# Patient Record
Sex: MALE | Race: ASIAN | NOT HISPANIC OR LATINO
[De-identification: names, ages, dates, MRNs, and addresses within clinical notes are randomized per-mention and may not be internally consistent; named-entity substitution may affect disease eponyms.]

---

## 2021-01-11 ENCOUNTER — APPOINTMENT (OUTPATIENT)
Dept: COLORECTAL SURGERY | Facility: CLINIC | Age: 51
End: 2021-01-11
Payer: MEDICAID

## 2021-01-11 VITALS
WEIGHT: 110 LBS | BODY MASS INDEX: 18.11 KG/M2 | HEIGHT: 65.35 IN | SYSTOLIC BLOOD PRESSURE: 88 MMHG | DIASTOLIC BLOOD PRESSURE: 60 MMHG | HEART RATE: 80 BPM | TEMPERATURE: 98.4 F

## 2021-01-11 DIAGNOSIS — Z87.891 PERSONAL HISTORY OF NICOTINE DEPENDENCE: ICD-10-CM

## 2021-01-11 DIAGNOSIS — B19.20 UNSPECIFIED VIRAL HEPATITIS C W/OUT HEPATIC COMA: ICD-10-CM

## 2021-01-11 PROBLEM — Z00.00 ENCOUNTER FOR PREVENTIVE HEALTH EXAMINATION: Status: ACTIVE | Noted: 2021-01-11

## 2021-01-11 PROCEDURE — 99202 OFFICE O/P NEW SF 15 MIN: CPT | Mod: 25

## 2021-01-11 PROCEDURE — 99072 ADDL SUPL MATRL&STAF TM PHE: CPT

## 2021-01-11 PROCEDURE — 46221 LIGATION OF HEMORRHOID(S): CPT

## 2021-01-11 RX ORDER — HYDROCORTISONE 25 MG/G
2.5 OINTMENT TOPICAL
Refills: 0 | Status: ACTIVE | COMMUNITY

## 2021-01-11 RX ORDER — ACETAMINOPHEN 500 MG/1
500 CAPSULE ORAL
Refills: 0 | Status: ACTIVE | COMMUNITY

## 2021-01-11 RX ORDER — MINERAL OIL, PETROLATUM, PHENYLEPHRINE HCL 2.5; 140; 749 MG/G; MG/G; MG/G
OINTMENT TOPICAL
Refills: 0 | Status: ACTIVE | COMMUNITY

## 2021-01-11 RX ORDER — LANSOPRAZOLE 30 MG/1
30 CAPSULE, DELAYED RELEASE ORAL
Refills: 0 | Status: ACTIVE | COMMUNITY

## 2021-01-11 RX ORDER — LINACLOTIDE 145 UG/1
145 CAPSULE, GELATIN COATED ORAL
Refills: 0 | Status: ACTIVE | COMMUNITY

## 2021-01-11 RX ORDER — CARBOXYMETHYLCELLULOSE SODIUM 5 MG/ML
0.5 SOLUTION/ DROPS OPHTHALMIC
Refills: 0 | Status: ACTIVE | COMMUNITY

## 2021-01-11 RX ORDER — B-COMPLEX WITH VITAMIN C
TABLET ORAL
Refills: 0 | Status: ACTIVE | COMMUNITY

## 2021-01-11 NOTE — PHYSICAL EXAM
[Excoriation] : no perianal excoriation [Skin Tags] : there were no residual hemorrhoidal skin tags seen [Normal] : was normal [None] : there was no rectal mass  [FreeTextEntry1] : A lighted anoscope was passed into the anal canal and the entire anal mucosal surface was inspected..  The findings revealed moderate internal hemorrhoids. No masses or lesions were identified.\par \par The risks and benefits of rubber band ligation were discussed with the patient including but not limited to bleeding, pain, infection, and the need for future procedures. The anoscope was placed and rubber band ligation was performed of the internal hemorrhoids - RAQ and RPQ with good result. The patient tolerated the procedure well. Appropriate postprocedure instructions were given to the patient.\par

## 2021-01-11 NOTE — HISTORY OF PRESENT ILLNESS
[FreeTextEntry1] : 51 y/o Cantonese speaking M presents for evaluation of hemorrhoids, referred by Dr. Sullivan\par H/o Hepatitis C. Completed treatment with Epclusa from 4/4/20-6/27/20 \par PSH of RBL of RA 10/19/20, LL column 11/6/20, LP column 11/1120, RP column 12/4/20, and RL column 12/18/20\par C/o issue with prolapsing hemorrhoids for the past 2 years that requires manual reduction\par Reports pain and BRBPR on the TP with every BM\par Denies itching\par BH: Daily \par Admits to constipation and straining\par Previously prescribed Linzess 145 mcg. Has been taking daily for 1 month with mild improvement in symptoms \par Admits to limited dietary fiber intake. Drinking 1-2 cups of water daily \par Has tried Prep H ointment and Hydrocortisone 2.5% ointment with little to no improvement in symptoms\par Last colonoscopy completed 10/19/20, two several benign polyps removed\par No ASA/NSAIDs last 7 days \par

## 2021-01-11 NOTE — ASSESSMENT
[FreeTextEntry1] : I had a detailed discussion with the patient regarding optimization of bowel movements with increasing daily fiber and water intake. Both synthetic and dietary fiber recommendations were reviewed. I had strongly counseled the patient regarding the need for increasing water to help improve  bowel function.\par \par I discussed with the patient that improving  bowel function will alleviate  hemorrhoidal symptoms.\par \par Advised return in 4-6 weeks if symptoms are persistent. Likely will require rubber band ligation of left lateral internal hemorrhoids.\par \par

## 2021-02-19 ENCOUNTER — APPOINTMENT (OUTPATIENT)
Dept: COLORECTAL SURGERY | Facility: CLINIC | Age: 51
End: 2021-02-19
Payer: MEDICAID

## 2021-02-19 VITALS
BODY MASS INDEX: 20.2 KG/M2 | HEIGHT: 63 IN | SYSTOLIC BLOOD PRESSURE: 104 MMHG | DIASTOLIC BLOOD PRESSURE: 72 MMHG | TEMPERATURE: 98 F | WEIGHT: 114 LBS | HEART RATE: 67 BPM

## 2021-02-19 PROCEDURE — 99072 ADDL SUPL MATRL&STAF TM PHE: CPT

## 2021-02-19 PROCEDURE — 46221 LIGATION OF HEMORRHOID(S): CPT

## 2021-02-19 PROCEDURE — 99213 OFFICE O/P EST LOW 20 MIN: CPT | Mod: 25

## 2021-02-19 NOTE — ASSESSMENT
[FreeTextEntry1] : Advise consideration of possible hemorrhoid Dearterialization procedure if prolapse is persistent.\par \par

## 2021-02-19 NOTE — PHYSICAL EXAM
[Excoriation] : no perianal excoriation [Skin Tags] : there were no residual hemorrhoidal skin tags seen [Normal] : was normal [None] : there was no rectal mass  [FreeTextEntry1] : A lighted anoscope was passed into the anal canal and the entire anal mucosal surface was inspected..  The findings revealed moderate internal hemorrhoids. No masses or lesions were identified.\par \par The risks and benefits of rubber band ligation were discussed with the patient including but not limited to bleeding, pain, infection, and the need for future procedures. The anoscope was placed and rubber band ligation was performed of the internal hemorrhoids - Left lateral and RAQ with good result. The patient tolerated the procedure well. Appropriate postprocedure instructions were given to the patient.\par

## 2021-04-09 ENCOUNTER — APPOINTMENT (OUTPATIENT)
Dept: COLORECTAL SURGERY | Facility: CLINIC | Age: 51
End: 2021-04-09
Payer: MEDICAID

## 2021-04-09 VITALS
HEART RATE: 76 BPM | HEIGHT: 63 IN | TEMPERATURE: 98.1 F | SYSTOLIC BLOOD PRESSURE: 97 MMHG | WEIGHT: 116 LBS | BODY MASS INDEX: 20.55 KG/M2 | DIASTOLIC BLOOD PRESSURE: 63 MMHG

## 2021-04-09 PROCEDURE — 99072 ADDL SUPL MATRL&STAF TM PHE: CPT

## 2021-04-09 PROCEDURE — 46221 LIGATION OF HEMORRHOID(S): CPT

## 2021-04-09 PROCEDURE — 99213 OFFICE O/P EST LOW 20 MIN: CPT | Mod: 25

## 2021-04-09 NOTE — HISTORY OF PRESENT ILLNESS
[FreeTextEntry1] : 51 y/o Cantonese speaking M presents for f/u hemorrhoids\par PSH of RBL of RA 10/19/20, LL column 11/6/20, LP column 11/11/20, RP column 12/4/20, and RL column 12/18/20\par \par Last seen 2/19/21 w/ complaint of unchanged symptoms and continued prolapsing tissue, s/p RBL to LL and RAQ.\par Still using hydrocortisone ointment 2-3 times per week when hemorrhoid prolapses. Will manually reduce hemorrhoid \par Denies pain, itching, bleeding\par BH: Daily to \par Admits occasional constipation and straining\par Previously on Linzess but no longer using, favors drinking a cup of prune juice daily for regularity\par Has been working on increasing dietary fiber intake. Currently Drinking 5-6 cups of water daily\par Last colonoscopy completed 10/19/20, two several benign polyps removed\par No ASA/NSAIDs last 7 days

## 2021-04-09 NOTE — ASSESSMENT
[FreeTextEntry1] : I had a detailed discussion with the patient regarding optimization of bowel movements with increasing daily fiber and water intake. Both synthetic and dietary fiber recommendations were reviewed. I had strongly counseled the patient regarding the need for increasing water to help improve  bowel function.\par \par I discussed with the patient that improving  bowel function will alleviate  hemorrhoidal symptoms.\par \par Advised return in 4-6 weeks if symptoms are persistent.I recommended the patient obtained a photographs prior to his next visit to further assess degree of residual protrusion. The potential of residual left-sided protrusion will be addressed if photo documentation confirms its presence.\par

## 2021-04-09 NOTE — PHYSICAL EXAM
[Excoriation] : no perianal excoriation [Skin Tags] : there were no residual hemorrhoidal skin tags seen [Normal] : was normal [None] : there was no rectal mass  [FreeTextEntry1] : A lighted anoscope was passed into the anal canal and the entire anal mucosal surface was inspected..  The findings revealed moderate internal hemorrhoids. No masses or lesions were identified.\par \par The risks and benefits of rubber band ligation were discussed with the patient including but not limited to bleeding, pain, infection, and the need for future procedures. The anoscope was placed and rubber band ligation was performed of the internal hemorrhoids - RPQ with good result. The patient tolerated the procedure well. Appropriate postprocedure instructions were given to the patient.\par

## 2021-05-21 ENCOUNTER — APPOINTMENT (OUTPATIENT)
Dept: COLORECTAL SURGERY | Facility: CLINIC | Age: 51
End: 2021-05-21
Payer: MEDICAID

## 2021-05-21 VITALS
DIASTOLIC BLOOD PRESSURE: 70 MMHG | HEIGHT: 63 IN | TEMPERATURE: 98.1 F | SYSTOLIC BLOOD PRESSURE: 104 MMHG | WEIGHT: 118 LBS | HEART RATE: 75 BPM | BODY MASS INDEX: 20.91 KG/M2

## 2021-05-21 PROCEDURE — 46221 LIGATION OF HEMORRHOID(S): CPT

## 2021-05-21 PROCEDURE — 99213 OFFICE O/P EST LOW 20 MIN: CPT | Mod: 25

## 2021-05-21 NOTE — PHYSICAL EXAM
[Excoriation] : no perianal excoriation [Skin Tags] : there were no residual hemorrhoidal skin tags seen [Normal] : was normal [None] : there was no rectal mass  [FreeTextEntry1] : Medical assistant was present for the entire exam.\par \par Anoscopy was performed for evaluation of the patients rectal bleeding  history .\par The risks, benefits and alternatives were reviewed.\par \par A lighted anoscope was passed into the anal canal and the entire anal mucosal surface was inspected..  \par The findings revealed moderate internal hemorrhoids.\par  No masses or lesions were identified.\par \par \par The risks and benefits of rubber band ligation were discussed with the patient including but not limited to bleeding, pain, infection, and the need for future procedures. The anoscope was placed and rubber band ligation was performed of the internal hemorrhoids- left lateral x 2 with good result. The patient tolerated the procedure well. Appropriate postprocedure instructions were given to the patient.\par

## 2021-05-21 NOTE — HISTORY OF PRESENT ILLNESS
[FreeTextEntry1] : The patient returns in followup for further evaluation of hemorrhoidal protrusion. Reports mild improvement since prior rubber band ligation. 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 No bleeding. Bowel movements regular. Reports mild intermittent straining.

## 2021-07-23 ENCOUNTER — APPOINTMENT (OUTPATIENT)
Dept: COLORECTAL SURGERY | Facility: CLINIC | Age: 51
End: 2021-07-23
Payer: MEDICAID

## 2021-07-23 VITALS
DIASTOLIC BLOOD PRESSURE: 73 MMHG | HEIGHT: 63 IN | HEART RATE: 73 BPM | TEMPERATURE: 98.2 F | SYSTOLIC BLOOD PRESSURE: 109 MMHG | WEIGHT: 117 LBS | BODY MASS INDEX: 20.73 KG/M2

## 2021-07-23 PROCEDURE — 46600 DIAGNOSTIC ANOSCOPY SPX: CPT

## 2021-07-23 NOTE — REASON FOR VISIT
[Follow-Up: _____] : a [unfilled] follow-up visit [Other: ______] : provided by CLEO [FreeTextEntry1] : [id number] [TWNoteComboBox1] : Maria Guadalupe

## 2021-07-23 NOTE — HISTORY OF PRESENT ILLNESS
[FreeTextEntry1] : 50 yo Cantonese speaking M presents for f/u hemorrhoids\par \par PSH of RBL of RA 10/19/20, LL column 11/6/20, LP column 11/11/20, RP column 12/4/20, RL column 12/18/20, LL and RAQ 2/19/21, RPQ 4/9/21\par \par Last seen 5/21 w/ report of mild improvement in hemorrhoidal prolapse symptoms,  s/p RBL to LL x 2\par c/o persistent left sided swollen tissue after BMs and hemorrhoid prolapses unrelated to BMs\par c/o pain at present as hemorrhoid is currently prolapsed\par + slight itching and burning\par Denies BRBPR\par He applies HC 2.5% to hemorrhoid and pushes it in 2-3 times per day\par He is open to surgical management\par BH: 1-2 times \par Admits occasional constipation and straining\par Previously on Linzess but no longer using, favors drinking a cup of prune juice daily for regularity\par Has been working on increasing dietary fiber intake. Currently Drinking 5-6 cups of water daily\par Last colonoscopy completed 10/19/20, two several benign polyps removed\par No ASA/NSAIDs last 7 days

## 2021-07-23 NOTE — PHYSICAL EXAM
[Excoriation] : no perianal excoriation [Skin Tags] : there were no residual hemorrhoidal skin tags seen [Normal] : was normal [None] : there was no rectal mass  [de-identified] : prolapsing left lateral internal /external hemorrhoid-  small / hemicircumferential [FreeTextEntry1] : Medical assistant was present for the entire exam.\par \par Anoscopy was performed for evaluation of the patients rectal bleeding  history .\par The risks, benefits and alternatives were reviewed.\par \par A lighted anoscope was passed into the anal canal and the entire anal mucosal surface was inspected..  \par The findings revealed moderate internal hemorrhoids.\par No masses or lesions were identified.\par \par \par The risks and benefits of rubber band ligation were discussed with the patient including but not limited to bleeding, pain, infection, and the need for future procedures. The anoscope was placed and rubber band ligation was performed of the internal hemorrhoids - left lateral with good result. The patient tolerated the procedure well. Appropriate postprocedure instructions were given to the patient.\par

## 2021-07-23 NOTE — ASSESSMENT
[FreeTextEntry1] : I had a detailed discussion with the patient regarding optimization of bowel movements with increasing daily fiber and water intake. Both synthetic and dietary fiber recommendations were reviewed. I had strongly counseled the patient regarding the need for increasing water to help improve  bowel function.\par \par I discussed with the patient that improving  bowel function will alleviate  hemorrhoidal symptoms.\par \par Advised return in 4-6 weeks if symptoms are persistent. reviewed concern of stricture with operative approach/excisional hemorrhoidectomy.\par \par

## 2021-10-25 ENCOUNTER — APPOINTMENT (OUTPATIENT)
Dept: COLORECTAL SURGERY | Facility: CLINIC | Age: 51
End: 2021-10-25

## 2021-10-29 ENCOUNTER — APPOINTMENT (OUTPATIENT)
Dept: COLORECTAL SURGERY | Facility: CLINIC | Age: 51
End: 2021-10-29
Payer: MEDICAID

## 2021-10-29 VITALS
HEART RATE: 73 BPM | TEMPERATURE: 98.2 F | HEIGHT: 63 IN | DIASTOLIC BLOOD PRESSURE: 66 MMHG | WEIGHT: 111 LBS | SYSTOLIC BLOOD PRESSURE: 96 MMHG | BODY MASS INDEX: 19.67 KG/M2

## 2021-10-29 PROCEDURE — 46221 LIGATION OF HEMORRHOID(S): CPT

## 2021-10-29 NOTE — HISTORY OF PRESENT ILLNESS
[FreeTextEntry1] : 50 y/o Cantonese speaking M presents for f/u hemorrhoids\par \par PSH of RBL of RA 10/19/20, LL column 11/6/20, LP column 11/11/20, RP column 12/4/20, RL column 12/18/20, LL and RAQ 2/19/21, RPQ 4/9/21, and LL on 7/23/21\par \par Last seen in the office on 7/23/21. Prolapsing left lateral internal/external hemorrhoids noted on exam with moderate internal hemorrhoids seen on anoscopy. RBL of LL hemorrhoid performed. Since then doing better, is not having any bleeding or pain during BMs. Does occasionally have prolapsing tissue, attempted to reduce once however it re-prolapsed.\par \par BH: Has been avoiding straining as much as possible, although still happens daily. Usually has hard BM that softens as he moves his bowels. If he does not have a BM after a few minutes, he goes on a short walk which usually allows him to pass a BM in less than 5 minutes. He denies any medical diagnoses, allergies, medications, or surgeries since he was last seen in clinic.\par \par Is not taking any supplemental stool softeners, fiber supplements or laxatives. Continues to take prune juice daily.\par \par Has not been keeping numerical track of his fiber intake, and only has about two cups of water per day along with a cup of prune juice for his bowel habits.\par \par Last colonoscopy completed 10/19/20, two several benign polyps removed\par No ASA/NSAIDs last 7 days \par

## 2021-10-29 NOTE — PHYSICAL EXAM
[Excoriation] : no perianal excoriation [Skin Tags] : there were no residual hemorrhoidal skin tags seen [Normal] : was normal [None] : there was no rectal mass  [FreeTextEntry1] : Medical assistant was present for the entire exam.\par \par Anoscopy was performed for evaluation of the patients rectal bleeding  history .\par The risks, benefits and alternatives were reviewed.\par \par A lighted anoscope was passed into the anal canal and the entire anal mucosal surface was inspected..  \par The findings revealed moderate internal hemorrhoids.\par No masses or lesions were identified.\par \par The risks and benefits of rubber band ligation were discussed with the patient including but not limited to bleeding, pain, infection, and the need for future procedures. The anoscope was placed and rubber band ligation was performed of the internal hemorrhoids - left anterior  with good result. The patient tolerated the procedure well. Appropriate postprocedure instructions were given to the patient.\par

## 2022-02-18 ENCOUNTER — APPOINTMENT (OUTPATIENT)
Dept: COLORECTAL SURGERY | Facility: CLINIC | Age: 52
End: 2022-02-18
Payer: MEDICAID

## 2022-02-18 VITALS
SYSTOLIC BLOOD PRESSURE: 100 MMHG | DIASTOLIC BLOOD PRESSURE: 68 MMHG | BODY MASS INDEX: 19.67 KG/M2 | TEMPERATURE: 97.2 F | HEIGHT: 63 IN | WEIGHT: 111 LBS | HEART RATE: 77 BPM

## 2022-02-18 DIAGNOSIS — K64.8 OTHER HEMORRHOIDS: ICD-10-CM

## 2022-02-18 PROCEDURE — 46221 LIGATION OF HEMORRHOID(S): CPT

## 2022-02-18 PROCEDURE — 99213 OFFICE O/P EST LOW 20 MIN: CPT | Mod: 25

## 2022-02-18 NOTE — HISTORY OF PRESENT ILLNESS
[FreeTextEntry1] : 50 y/o Cantonese speaking M presents for f/u of hemorrhoids. \par PSH of RBL of RA 10/19/20, LL column 11/6/20, LP column 11/11/20, RP column 12/4/20, RL column 12/18/20, LL and RAQ 2/19/21, RPQ 4/9/21, and LL on 7/23/21\par \par Last seen in the office 10/29/21: External hemorrhoid there was no residual hemorrhoidal skin tags seen. The sphincter tone was normal. There was no rectal tenderness present. Anoscopy performed findings revealed moderate internal hemorrhoids. No masses or lesions were identified. Rubber band ligation was completed to the internal hemorrhoids- left anterior with good result. Pt tolerated procedure well. \par \par He did well after his initial banding and had resolution of his hemorrhoidal prolapse however for at least the last month has felt the tissue prolapse recur. Denies any bleeding or pain. Denies any new hospitalizations/surgeries/diagnoses since his last visit.\par \par BH: 2 BMs per day, first one is usually hard and the second is soft.\par Does not take any additional fiber but drinks a glass of prune juice per day. Does not keep track of his fiber intake. Drinks about 4 glasses of water per day.\par Denies use of laxatives or stool softeners. \par \par FMH of colorectal cancer\par Last colonoscopy completed 10/19/20, two several benign polyps removed\par No ASA/NSAIDs in the last 7 days

## 2022-02-18 NOTE — PHYSICAL EXAM
[Respiratory Effort] : normal respiratory effort [Normal Rate and Rhythm] : normal rate and rhythm [No Edema] : No edema [Alert] : alert [Oriented to Person] : oriented to person [Oriented to Place] : oriented to place [Oriented to Time] : oriented to time [Calm] : calm [Wheezing] : no wheezing was heard [de-identified] : soft, non-tender, non-distended [de-identified] : male of stated age in NAD [FreeTextEntry1] : Medical assistant was present for the entire exam.\par \par Anoscopy was performed for evaluation of the patients rectal bleeding  history .\par The risks, benefits and alternatives were reviewed.\par \par A lighted anoscope was passed into the anal canal and the entire anal mucosal surface was inspected..  \par The findings revealed mild/moderate internal hemorrhoids.\par No masses or lesions were identified.\par \par The risks and benefits of rubber band ligation were discussed with the patient including but not limited to bleeding, pain, infection, and the need for future procedures. The anoscope was placed and rubber band ligation was performed of the internal hemorrhoids–left anterior quadrant with good result. The patient tolerated the procedure well. Appropriate postprocedure instructions were given to the patient.\par

## 2022-08-15 NOTE — HISTORY OF PRESENT ILLNESS
norco       Last Written Prescription Date:  7/20/22  Last Fill Quantity: 60,   # refills: 0  Last Office Visit: 12/7/21  Future Office visit:       Routing refill request to provider for review/approval because:       [FreeTextEntry1] : 51 yo Cantonese speaking M presents for f/u hemorrhoids\par PSH of RBL of RA 10/19/20, LL column 11/6/20, LP column 11/1120, RP column 12/4/20, and RL column 12/18/20\par Seen in the office for initial consult on 1/11/21. RBL of RAQ and RPQ performed\par Patient denies improvement in symptoms. Continues to experience prolapsing tissue w/ every BM associated w/ pain. Hemorrhoids require manual reduction w/ temporary improvement until hemorrhoid prolapses again when he passes gas. Patient occasionally applies topical HC 2.5% w/ some improvement\par Denies itching, bleeding\par \par BH:1-2 times daily w/ some straining, however reports constipation overall improved. Previously prescribed Linzess 145 mcg w/ reported mild improvement in symptoms at last visit, however patient stopped medication 10 days ago and has been drinking prune juice daily w/ noted improvement in BMs\par Has been trying to increase dietary fiber and water intake, now drinks 3 large glasses of water daily\par Last colonoscopy completed 10/19/20, two several benign polyps removed\par No ASA/NSAIDs last 7 days